# Patient Record
Sex: FEMALE | Race: WHITE | NOT HISPANIC OR LATINO | Employment: OTHER | URBAN - NONMETROPOLITAN AREA
[De-identification: names, ages, dates, MRNs, and addresses within clinical notes are randomized per-mention and may not be internally consistent; named-entity substitution may affect disease eponyms.]

---

## 2017-06-20 RX ORDER — METRONIDAZOLE 7.5 MG/G
GEL TOPICAL 2 TIMES DAILY
COMMUNITY
End: 2019-02-02

## 2017-08-14 ENCOUNTER — OFFICE VISIT (OUTPATIENT)
Dept: OTOLARYNGOLOGY | Facility: OTHER | Age: 72
End: 2017-08-14
Attending: FAMILY MEDICINE
Payer: COMMERCIAL

## 2017-08-14 VITALS
WEIGHT: 165 LBS | TEMPERATURE: 98.6 F | DIASTOLIC BLOOD PRESSURE: 78 MMHG | SYSTOLIC BLOOD PRESSURE: 122 MMHG | BODY MASS INDEX: 27.49 KG/M2 | HEIGHT: 65 IN | HEART RATE: 77 BPM | OXYGEN SATURATION: 96 %

## 2017-08-14 DIAGNOSIS — R49.0 DYSPHONIA: ICD-10-CM

## 2017-08-14 DIAGNOSIS — R09.89 THROAT CLEARING: ICD-10-CM

## 2017-08-14 DIAGNOSIS — J34.2 DNS (DEVIATED NASAL SEPTUM): ICD-10-CM

## 2017-08-14 DIAGNOSIS — R09.82 PND (POST-NASAL DRIP): Primary | ICD-10-CM

## 2017-08-14 DIAGNOSIS — J34.3 NASAL TURBINATE HYPERTROPHY: ICD-10-CM

## 2017-08-14 DIAGNOSIS — J30.89 SEASONAL ALLERGIC RHINITIS DUE TO OTHER ALLERGIC TRIGGER, UNSPECIFIED CHRONICITY: ICD-10-CM

## 2017-08-14 PROCEDURE — 92504 EAR MICROSCOPY EXAMINATION: CPT

## 2017-08-14 PROCEDURE — 31575 DIAGNOSTIC LARYNGOSCOPY: CPT | Performed by: OTOLARYNGOLOGY

## 2017-08-14 PROCEDURE — 99204 OFFICE O/P NEW MOD 45 MIN: CPT | Mod: 25 | Performed by: OTOLARYNGOLOGY

## 2017-08-14 PROCEDURE — 92504 EAR MICROSCOPY EXAMINATION: CPT | Performed by: OTOLARYNGOLOGY

## 2017-08-14 PROCEDURE — 99203 OFFICE O/P NEW LOW 30 MIN: CPT | Mod: 25

## 2017-08-14 RX ORDER — MEDROXYPROGESTERONE ACETATE 2.5 MG/1
2.5 TABLET ORAL DAILY
COMMUNITY
Start: 2012-09-17

## 2017-08-14 RX ORDER — MULTIVIT-MIN/IRON/FOLIC ACID/K 18-600-40
1 CAPSULE ORAL DAILY
COMMUNITY
Start: 2012-09-17

## 2017-08-14 RX ORDER — FLUTICASONE PROPIONATE 50 MCG
1-2 SPRAY, SUSPENSION (ML) NASAL DAILY
Qty: 1 BOTTLE | Refills: 11 | Status: SHIPPED | OUTPATIENT
Start: 2017-08-14 | End: 2019-02-02

## 2017-08-14 RX ORDER — HYDROCHLOROTHIAZIDE 25 MG/1
25 TABLET ORAL DAILY
COMMUNITY
Start: 2012-09-17

## 2017-08-14 RX ORDER — METRONIDAZOLE 10 MG/G
1 GEL TOPICAL DAILY
COMMUNITY
Start: 2013-03-13

## 2017-08-14 ASSESSMENT — PAIN SCALES - GENERAL: PAINLEVEL: NO PAIN (0)

## 2017-08-14 NOTE — NURSING NOTE
I spoke with the patient today regarding allergy skin testing.    All general instructions are reviewed with the patient.      The patient is made aware of all medications that need to be held prior to testing, and will stop medications as directed per instructions.  The patient verbalized understanding and agrees with plan.      Should the patient be given any additional medications prior to the day of testing, they are told to let the provider know that they are going to be allergy tested, so medications that need to be help prior to MQT are not prescribed.      An appointment will be arranged by the ENT [facility] for testing date and time. [facility] scheduling pt at this time. Pt instructions given to pt. Pt verbalizes understanding.     Danitza Chaudhry RN

## 2017-08-14 NOTE — PATIENT INSTRUCTIONS
Thank you for allowing Dr. Laech and our ENT team to participate in your care.  If you have a scheduling or an appointment question please contact [facility] Coordinator at their direct line [phone].   ALL nursing questions or concerns can be directed to your ENT nurse at: [phone] - [place]      Complete Allergy Skin Testing  Use Flonase as directed  Use Irene Med Saline Irrigation Twice Daily    Use high volume irene med saline irrigation.  Use warm distilled water and 2 packets of the salt solution that comes with the bottle, dissolve in bottle up to 240 ml shahab.  Irrigate each side of your nose leaning over the sink, using 1/3 to 1/2 the volume of the bottle in each nostril every irrigation.  Irrigate 2 times daily and as needed.    Indications for allergy testing include:   1) Confirm suspicion of allergic rhinitis due to inhalant allergies  2) Identify the offending allergen to determine specific mode of treatment  3) In the case of chronic rhinosinusitis: when symptoms are not controlled by avoidance and pharmacotherapy  4) In the Asthma patient when exacerbations may be due to perennial allergen exposure  5) Suspect food allergy  6) Otitis Media, chronic rhinitis, atopic dermatitis, Meniere disease, headache, pharyngitis or eye symptoms    modified quantitative testing (MQT) will be performed.  Signed consent was obtained, and the risks of immunotherapy were discussed, including the potential for anaphylaxis.    If immunotherapy (IT) is recommended, there is continued risk of anaphylaxis.   Anaphylaxis can cause death. The patient will need to be monitored for 30 minutes post injection.  They must present their epinephrine pen prior to injection.  Subcutaneous as well as sublingual immunotherapy (SLIT) were discussed as potential treatment options.  The patient was told SLIT is not approved by the FDA and is cash pay.  The general time frame of immunotherapy was discussed (generally  3-5 years, sometimes longer), and the basic immunology behind IT was discussed.      Adult lifestyle changes to prevent LPR reviewed      Avoid eating and drinking within two to three hours prior to bedtime    Do not drink alcohol    Eat small meals and slowly    Limit problem foods:    o Caffeine  o Carbonated drinks  o Chocolate  o Peppermint  o Tomato  o Citrus fruits  o Fatty and fried foods      Lose weight    Quit smoking    Wear loose clothing

## 2017-08-14 NOTE — PROGRESS NOTES
Otolaryngology Consultation    Patient: Maryse Rachel  : 1945    Chief Complaint   Patient presents with     Consult     Chronic postnasal drip, hoarseness, allergies Referred by Yury     HPI:  Maryse Rachel is a 72 year old female seen today for concerns regarding allergies. Since October, she was having issues with  PND, clearing the throat, sore throats, intermittent dysphonia, clear rhinorrhea, cough to clear throat, watery eyes and occasional sneezing. No sinus pain/pressure. No recent sinus infections. Claritin and Zyrtec initially worked for just a while. Most recently on Allegra with some noted improvement in symptoms. She continues to constantly clear throat. Feels she lost her singing voice. No otalgia. Some coughing when she is clearing her throat. No wheezing or SOB.     No issues with heartburn. Past history of smoking 50 years ago. Couple cups coffee per day. 2 drinks 3 days a week. 2 glasses of water per day.     No history of allergy testing. Lives in house. Unfinished basement. No water or mold issues. No animals in the house. No new exposures.   Sisters with allergies.    Current Outpatient Rx   Medication Sig Dispense Refill     VITAMIN D, CHOLECALCIFEROL, PO Take 2,000 Units by mouth daily       Ibuprofen (ADVIL PO) Take 200 mg by mouth every 6 hours as needed for moderate pain       HYDROCHLOROTHIAZIDE PO Take 25 mg by mouth daily       MedroxyPROGESTERone Acetate (PROVERA PO) Take 2.5 mg by mouth daily       metroNIDAZOLE (METROGEL) 0.75 % topical gel Apply topically 2 times daily         Allergies: Bactrim [sulfamethoxazole w/trimethoprim]     Past Medical History:   Diagnosis Date     Bilateral knee pain      Bilateral lower extremity edema      Essential hypertension      Postmenopausal hormone replacement therapy      Primary osteoarthritis involving multiple joints      Rectocele      Rosacea        Past Surgical History:   Procedure Laterality Date     C TOTAL KNEE  "ARTHROPLASTY Right 2015     COLONOSCOPY  2003     HERNIA REPAIR, INGUINAL RT/LT  2003       ENT family history reviewed    Social History   Substance Use Topics     Smoking status: Former Smoker     Smokeless tobacco: Not on file     Alcohol use Not on file       Review of Systems  ROS: 10 point ROS neg other than the symptoms noted above in the HPI. Also notes history of HTN.     Physical Exam  /78 (BP Location: Left arm, Patient Position: Chair, Cuff Size: Adult Regular)  Pulse 77  Temp 98.6  F (37  C) (Tympanic)  Ht 5' 5\" (1.651 m)  Wt 165 lb (74.8 kg)  SpO2 96%  BMI 27.46 kg/m2    General - The patient is well nourished and well developed, and appears to have good nutritional status.  Alert and oriented to person and place, answers questions and cooperates with examination appropriately.   Head and Face - Normocephalic and atraumatic, with no gross asymmetry noted.  The facial nerve is intact, with strong symmetric movements.  Voice and Breathing - The patient was breathing comfortably without the use of accessory muscles. There was no wheezing, stridor, or stertor.  The patients voice was clear and strong, and had appropriate pitch and quality.  Ears - External ears normal. The ear canals were examined underneath the operating microscope and with an otologic speculum. The canals were cleaned of all debris with gently suctioning and use of alligator forceps. There is no granulation tissue or sign of cholesteatoma. The patient tolerates this well. TM's are intact without effusion.  Eyes - Extraocular movements intact, and the pupils were reactive to light.  Sclera were not icteric or injected, conjunctiva were pink and moist.  Mouth - Examination of the oral cavity showed pink, healthy oral mucosa. No lesions or ulcerations noted.  The tongue was mobile and midline, and the dentition were in good condition.    Throat - The walls of the oropharynx were smooth, pink, moist, symmetric, and had no lesions " or ulcerations.  The tonsillar pillars and soft palate were symmetric. Tonsils grade 2. The uvula was midline on elevation.    Neck - Normal midline excursion of the laryngotracheal complex during swallowing.  Full range of motion on passive movement.  Palpation of the occipital, submental, submandibular, internal jugular chain, and supraclavicular nodes did not demonstrate any abnormal lymph nodes or masses.  Palpation of the thyroid was soft and smooth, with no nodules or goiter appreciated.  The trachea was mobile and midline.  Nose - External contour is symmetric, no gross deflection or scars.  Nasal mucosa is pink and moist with no abnormal mucus.    Attempts at mirror laryngoscopy were not possible due to gag reflex.  Therefore I proceeded with a fiberoptic examination after informed consent.  First I sprayed both sides of the nose with a mixture of lidocaine and neosynephrine.  I then passed the scope through the nasal cavity.     The nasal cavity was remarkable for: Turbinate hypertrophy bilateral with probable right santos.  Right DNS. No purulence or polyps noted.    The nasopharynx was mucosally covered and symmetric.  The eustachian tube openings were unobstructed.  Going further down I had a clear view of the base of tongue which had normal appearing lingual tonsillar tissue.  The base of tongue was free of lesions, and the vallecula was open.  The epiglottis was smooth and mucosally covered.  The supraglottic larynx was then clearly visualized.  The vocal cords moved smoothly and symmetrically and were pearly white.  The pyriform sinuses were open and without stella mass or pooling of secretions upon valsalva, and the limited view of the postcricoid region did not show any lesions.  The patient tolerated the procedure well.      ICD-10-CM    1. PND (post-nasal drip) R09.82    2. Throat clearing R68.89    3. Dysphonia R49.0    4. Seasonal allergic rhinitis due to other allergic trigger, unspecified  chronicity J30.89 fluticasone (FLONASE) 50 MCG/ACT spray   5. Nasal turbinate hypertrophy J34.3 fluticasone (FLONASE) 50 MCG/ACT spray   6. DNS (deviated nasal septum): right J34.2      Reassured no endolaryngeal mass  Start Flonase daily. BID Mohamud Med Sinus irrigation.  Increase intake of water to 6-8 glasses per day.  Dairy elimination trial.  Follow up after MQT done.  Consider in office TR of congestion persists, d/w Rachel today    Allergen avoidance measures were discussed and are important in preventing symptoms from occurring or worsening.     Follow up for allergy testing as recommended.  This may be a blood test (mRAST) or skin testing ( modified quantitative testing).     Indications for allergy testing include:   1) Confirm suspicion of allergic rhinitis due to inhalant allergies  2) Identify the offending allergen to determine specific mode of treatment  3) In the case of chronic rhinosinusitis: when symptoms are not controlled by avoidance and pharmacotherapy  4) In the Asthma patient when exacerbations may be due to perennial allergen exposure  5) Suspect food allergy  6) Otitis Media, chronic rhinitis, atopic dermatitis, Meniere disease, headache, pharyngitis or eye symptoms     Signed consent was obtained, and the risks of immunotherapy were discussed, including the potential for anaphylaxis    Stop your antihistamine 5 days before allergy testing.    If immunotherapy (IT) is recommended, there is continued risk of anaphylaxis. Anaphylaxis can cause death. The patient will need to be monitored for 30 minutes post injection. They must present their epinephrine pen prior to injection.  Subcutaneous as well as sublingual immunotherapy (SLIT) were discussed as potential treatment options. The patient was told SLIT is not approved by the FDA and is cash pay. The general time frame of immunotherapy was discussed (generally 3-5 years, sometimes longer), and the basic immunology behind IT was  discussed.    Alice Leach D.O.  Otolaryngology/Head and Neck Surgery  Allergy  I am the attending ENT physician supervising the training Nurse Practitioner or Physician Assistant.  I have observed and participated in the history and exam and agree with the documented findings.     Josephine Romero NP  ENT

## 2017-08-14 NOTE — NURSING NOTE
"Chief Complaint   Patient presents with     Consult     Chronic postnasal drip, hoarseness, allergies Referred by Yury       Initial /78 (BP Location: Left arm, Patient Position: Chair, Cuff Size: Adult Regular)  Pulse 77  Temp 98.6  F (37  C) (Tympanic)  Ht 5' 5\" (1.651 m)  Wt 165 lb (74.8 kg)  SpO2 96%  BMI 27.46 kg/m2 Estimated body mass index is 27.46 kg/(m^2) as calculated from the following:    Height as of this encounter: 5' 5\" (1.651 m).    Weight as of this encounter: 165 lb (74.8 kg).  Medication Reconciliation: complete   Talisha Fonseca          "

## 2017-08-14 NOTE — MR AVS SNAPSHOT
After Visit Summary   8/14/2017    Maryse Rachel    MRN: [id number]           Patient Information     Date Of Birth          1945        Visit Information        Provider Department      8/14/2017 9:15 AM Alice Leach MD [facility]        Today's Diagnoses     PND (post-nasal drip)    -  1    Throat clearing        Dysphonia          Care Instructions    Thank you for allowing Dr. Leach and our ENT team to participate in your care.  If you have a scheduling or an appointment question please contact [facility] Unit Coordinator at their direct line [phone].   ALL nursing questions or concerns can be directed to your ENT nurse at: [phone] - [place]      Complete Allergy Skin Testing  Use Flonase as directed  Use Irene Med Saline Irrigation Twice Daily    Use high volume irene med saline irrigation.  Use warm distilled water and 2 packets of the salt solution that comes with the bottle, dissolve in bottle up to 240 ml shahab.  Irrigate each side of your nose leaning over the sink, using 1/3 to 1/2 the volume of the bottle in each nostril every irrigation.  Irrigate 2 times daily and as needed.    Indications for allergy testing include:   1) Confirm suspicion of allergic rhinitis due to inhalant allergies  2) Identify the offending allergen to determine specific mode of treatment  3) In the case of chronic rhinosinusitis: when symptoms are not controlled by avoidance and pharmacotherapy  4) In the Asthma patient when exacerbations may be due to perennial allergen exposure  5) Suspect food allergy  6) Otitis Media, chronic rhinitis, atopic dermatitis, Meniere disease, headache, pharyngitis or eye symptoms    modified quantitative testing (MQT) will be performed.  Signed consent was obtained, and the risks of immunotherapy were discussed, including the potential for anaphylaxis.    If immunotherapy (IT) is recommended, there is continued risk of anaphylaxis.    "Anaphylaxis can cause death. The patient will need to be monitored for 30 minutes post injection.  They must present their epinephrine pen prior to injection.  Subcutaneous as well as sublingual immunotherapy (SLIT) were discussed as potential treatment options.  The patient was told SLIT is not approved by the FDA and is cash pay.  The general time frame of immunotherapy was discussed (generally 3-5 years, sometimes longer), and the basic immunology behind IT was discussed.      Adult lifestyle changes to prevent LPR reviewed      Avoid eating and drinking within two to three hours prior to bedtime    Do not drink alcohol    Eat small meals and slowly    Limit problem foods:    o Caffeine  o Carbonated drinks  o Chocolate  o Peppermint  o Tomato  o Citrus fruits  o Fatty and fried foods      Lose weight    Quit smoking    Wear loose clothing            Follow-ups after your visit        Who to contact     If you have questions or need follow up information about today's clinic visit or your schedule please contact [facility] directly at [phone].  Normal or non-critical lab and imaging results will be communicated to you by MyChart, letter or phone within 4 business days after the clinic has received the results. If you do not hear from us within 7 days, please contact the clinic through [employer] or phone. If you have a critical or abnormal lab result, we will notify you by phone as soon as possible.  Submit refill requests through [employer] or call your pharmacy and they will forward the refill request to us. Please allow 3 business days for your refill to be completed.          Additional Information About Your Visit        [employer] Information     [employer] lets you send messages to your doctor, view your test results, renew your prescriptions, schedule appointments and more. To sign up, go to [email or web address] . Click on \"Log in\" on the left side of the screen, which will take you to the " "Welcome page. Then click on \"Sign up Now\" on the right side of the page.     You will be asked to enter the access code listed below, as well as some personal information. Please follow the directions to create your username and password.     Your access code is: [email or web address]  Expires: 2017  9:42 AM     Your access code will  in 90 days. If you need help or a new code, please call your [facility] clinic or [phone].        Care EveryWhere ID     This is your Care EveryWhere ID. This could be used by other organizations to access your [facility] medical records  [id number]        Your Vitals Were     Pulse Temperature Height Pulse Oximetry BMI (Body Mass Index)       77 98.6  F (37  C) (Tympanic) 5' 5\" (1.651 m) 96% 27.46 kg/m2        Blood Pressure from Last 3 Encounters:   17 122/78    Weight from Last 3 Encounters:   17 165 lb (74.8 kg)              Today, you had the following     No orders found for display       Primary Care Provider    [facility] Other Clinic                Equal Access to Services     [facility]: Hadii lindy ku arti Up, waaxda luqadaha, qaybta kaalmada david, palmer santiago . So [facility] [phone].    ATENCIÓN: Si habla español, tiene a ball disposición servicios gratuitos de asistencia lingüística. Dudley al [phone].    We comply with applicable federal civil rights laws and [place] laws. We do not discriminate on the basis of race, color, national origin, age, disability sex, sexual orientation or gender identity.            Thank you!     Thank you for choosing [facility] [place]  for your care. Our goal is always to provide you with excellent care. Hearing back from our patients is one way we can continue to improve our services. Please take a few minutes to complete the written survey that you may receive in the mail after your visit with us. Thank you!             Your Updated Medication List - Protect others around you: " Learn how to safely use, store and throw away your medicines at www.disposemymeds.org.          This list is accurate as of: 8/14/17  9:42 AM.  Always use your most recent med list.                   Brand Name Dispense Instructions for use Diagnosis    ADVIL PO      Take 200 mg by mouth every 6 hours as needed for moderate pain        ALLEGRA ALLERGY PO      Take 1 capsule by mouth daily        * HYDROCHLOROTHIAZIDE PO      Take 25 mg by mouth daily        * hydrochlorothiazide 25 MG tablet    HYDRODIURIL     Take 25 mg by mouth daily        * metroNIDAZOLE 0.75 % topical gel    METROGEL     Apply topically 2 times daily        * metroNIDAZOLE 1 % gel    METROGEL     Apply 1 capful topically daily        * PROVERA PO      Take 2.5 mg by mouth daily        * medroxyPROGESTERone 2.5 MG tablet    PROVERA     Take 2.5 mg by mouth daily        * VITAMIN D (CHOLECALCIFEROL) PO      Take 2,000 Units by mouth daily        * vitamin D 2000 UNITS Caps      Take 1 tablet by mouth daily        * Notice:  This list has 8 medication(s) that are the same as other medications prescribed for you. Read the directions carefully, and ask your doctor or other care provider to review them with you.

## 2017-09-11 ENCOUNTER — OFFICE VISIT (OUTPATIENT)
Dept: ALLERGY | Facility: OTHER | Age: 72
End: 2017-09-11
Attending: NURSE PRACTITIONER
Payer: COMMERCIAL

## 2017-09-11 ENCOUNTER — OFFICE VISIT (OUTPATIENT)
Dept: OTOLARYNGOLOGY | Facility: OTHER | Age: 72
End: 2017-09-11
Attending: OTOLARYNGOLOGY
Payer: MEDICARE

## 2017-09-11 VITALS
DIASTOLIC BLOOD PRESSURE: 86 MMHG | TEMPERATURE: 98.8 F | SYSTOLIC BLOOD PRESSURE: 156 MMHG | BODY MASS INDEX: 28.17 KG/M2 | HEIGHT: 64 IN | WEIGHT: 165 LBS

## 2017-09-11 VITALS
DIASTOLIC BLOOD PRESSURE: 86 MMHG | BODY MASS INDEX: 28.17 KG/M2 | HEIGHT: 64 IN | SYSTOLIC BLOOD PRESSURE: 156 MMHG | TEMPERATURE: 98.8 F | WEIGHT: 165 LBS

## 2017-09-11 DIAGNOSIS — J34.2 DNS (DEVIATED NASAL SEPTUM): ICD-10-CM

## 2017-09-11 DIAGNOSIS — J34.3 NASAL TURBINATE HYPERTROPHY: ICD-10-CM

## 2017-09-11 DIAGNOSIS — J30.89 PERENNIAL ALLERGIC RHINITIS: Primary | ICD-10-CM

## 2017-09-11 DIAGNOSIS — J30.2 SEASONAL ALLERGIC RHINITIS: Primary | ICD-10-CM

## 2017-09-11 PROCEDURE — 95004 PERQ TESTS W/ALRGNC XTRCS: CPT

## 2017-09-11 PROCEDURE — 99213 OFFICE O/P EST LOW 20 MIN: CPT | Performed by: OTOLARYNGOLOGY

## 2017-09-11 PROCEDURE — 99212 OFFICE O/P EST SF 10 MIN: CPT | Mod: 25

## 2017-09-11 PROCEDURE — 95024 IQ TESTS W/ALLERGENIC XTRCS: CPT

## 2017-09-11 ASSESSMENT — PAIN SCALES - GENERAL
PAINLEVEL: NO PAIN (0)
PAINLEVEL: NO PAIN (0)

## 2017-09-11 NOTE — NURSING NOTE
"Chief Complaint   Patient presents with     other     MQT allergy skin testing       Initial /86 (BP Location: Right arm, Patient Position: Sitting, Cuff Size: Adult Regular)  Temp 98.8  F (37.1  C) (Tympanic)  Ht 5' 4\" (1.626 m)  Wt 165 lb (74.8 kg)  BMI 28.32 kg/m2 Estimated body mass index is 28.32 kg/(m^2) as calculated from the following:    Height as of this encounter: 5' 4\" (1.626 m).    Weight as of this encounter: 165 lb (74.8 kg).  Medication Reconciliation: complete       Patient presents for MQT allergy skin testing.    All medications were reviewed with patient.  Patient has held pertinent medications for testing.  Patient has not been sick recently and denies any major reactions of any type.  Patient denies having allergy testing in the past.        Current symptoms patient is experiencing include post nasal drip, a cough that causes her voice to be hoarse and her throat sore, itchy, watery eyes, and periodic sinus infections that have required abx treatment.  Patient states that the symptoms have been present throughout the year.  She does not notice a difference with the seasons.        Patient lives in a single family home with a basement.  The home is 39 years old and is located in [facility].  Patient reports the basement is dry, and she has not noticed mold.  The home is heated with natural gas, forced air heat and the home has central air.  The home has carpet, including carpet in the bedroom.  There are no pets in the home.     Consent was signed and patient signature verified.      Josephine Romero CNP, is not in today, but patient will meet with Dr. Leach after testing.          Patient tolerated allergy skin testing well.  Testing was done according to standard MQT protocol.  Test results were reviewed with the patient and she is given written information on allergy.  Patient will be seen by Dr. Leach.    Amber Sutlana RN  "

## 2017-09-11 NOTE — NURSING NOTE
"No chief complaint on file.      Initial /86  Temp 98.8  F (37.1  C) (Tympanic)  Ht 5' 4\" (1.626 m)  Wt 165 lb (74.8 kg)  BMI 28.32 kg/m2 Estimated body mass index is 28.32 kg/(m^2) as calculated from the following:    Height as of this encounter: 5' 4\" (1.626 m).    Weight as of this encounter: 165 lb (74.8 kg).  Medication Reconciliation: shyam Wilder      "

## 2017-09-11 NOTE — PATIENT INSTRUCTIONS
Thank you for allowing Dr. Leahc and our ENT team to participate in your care.  If you have a scheduling or an appointment question please contact [facility] our Health Unit Coordinator at their direct line [phone].   ALL nursing questions or concerns can be directed to your ENT nurse at: [phone] - [place]    Use Mohamud Med Saline Twice Daily as needed  Continue Flonase 2 sprays, twice daily for 1 month, then 2 sprays once daily   Continue Allegra  Maintain Allergy Avoidance  If no improvement, consider Allergy Shots

## 2017-09-11 NOTE — PROGRESS NOTES
"Otolaryngology Progress Note      History of Present Illness   No chief complaint on file.      Maryse (RACHEL) VAL Rachel is a 72 year old female   with chronic PND and throat clearing who presents for re-evaluation  Using bid irene med    Intradermal testing reviewed with Rachel:  High sensitivities to grass, oak, dust, moderates to weeds, additional trees, molds, and epidermals      Has been using nasal steroids and allegra. Stopped allegra prior to testing    Feels post nasal drainage may be somewhat improved    Physical Exam  /86  Temp 98.8  F (37.1  C) (Tympanic)  Ht 5' 4\" (1.626 m)  Wt 165 lb (74.8 kg)  BMI 28.32 kg/m2    General - The patient is well nourished and well developed, and appears to have good nutritional status.  Alert and oriented to person and place, interactive.  Head and Face - Normocephalic and atraumatic, with no gross asymmetry noted of the contour of the facial features.  The facial nerve is intact, with strong symmetric movements.  Nasal mucosa is pink and moist with no abnormal mucus.  The septum was grossly midline and non-obstructive, turbinates of normal size and position.  No polyps, masses, or purulence noted on examination. DNS Right ,  inferior turbinate hypertrophy     Impression/Plan  Maryse Rachel is a 72 year old female    ICD-10-CM    1. Perennial allergic rhinitis J30.89    2. DNS (deviated nasal septum) J34.2    3. Nasal turbinate hypertrophy J34.3      Subcutaneous as well as sublingual immunotherapy (SLIT) were discussed as potential treatment options if medical mgmt is not effective, as well as office turbinate reduction    The patient was told SLIT is not approved by the FDA and is cash pay.  The general time frame of immunotherapy was discussed (generally 3-5 years, sometimes longer), and the basic immunology behind IT was discussed.    She will continue medical mgmt and contact us if symptoms progress  Food cross-reactivities also discussed  She is happy with " this plan      Alice Leach D.O.  Otolaryngology/Head and Neck Surgery  Allergy

## 2017-09-11 NOTE — NURSING NOTE
"Initial /86 (BP Location: Right arm, Patient Position: Sitting, Cuff Size: Adult Regular)  Temp 98.8  F (37.1  C) (Tympanic)  Ht 5' 4\" (1.626 m)  Wt 165 lb (74.8 kg)  BMI 28.32 kg/m2 Estimated body mass index is 28.32 kg/(m^2) as calculated from the following:    Height as of this encounter: 5' 4\" (1.626 m).    Weight as of this encounter: 165 lb (74.8 kg).  Medication Reconciliation: complete       Patient presents for MQT allergy skin testing.    All medications were reviewed with patient.  Patient has held pertinent medications for testing.  Patient has not been sick recently and denies any major reactions of any type.  Patient denies having allergy testing in the past.        Current symptoms patient is experiencing include post nasal drip, a cough that causes her voice to be hoarse and her throat sore, itchy, watery eyes, and periodic sinus infections that have required abx treatment.  Patient states that the symptoms have been present throughout the year.  She does not notice a difference with the seasons.        Patient lives in a single family home with a basement.  The home is 39 years old and is located in [facility].  Patient reports the basement is dry, and she has not noticed mold.  The home is heated with natural gas, forced air heat and the home has central air.  The home has carpet, including carpet in the bedroom.  There are no pets in the home.     Consent was signed and patient signature verified.      Josephine Romero CNP, is not in today, but patient will meet with Dr. Leach after testing.          Patient tolerated allergy skin testing well.  Testing was done according to standard MQT protocol.  Test results were reviewed with the patient and she is given written information on allergy.  Patient will be seen by Dr. Leach.    Amber Sultana RN  "

## 2017-09-11 NOTE — MR AVS SNAPSHOT
"              After Visit Summary   2017    Maryse Rachel    MRN: [id number]           Patient Information     Date Of Birth          1945        Visit Information        Provider Department      2017 8:30 AM HC ALLERGY TESTING [facility]        Today's Diagnoses     Seasonal allergic rhinitis    -  1       Follow-ups after your visit        Who to contact     If you have questions or need follow up information about today's clinic visit or your schedule please contact [facility] directly at [phone].  Normal or non-critical lab and imaging results will be communicated to you by Evcarcohart, letter or phone within 4 business days after the clinic has received the results. If you do not hear from us within 7 days, please contact the clinic through Evcarcohart or phone. If you have a critical or abnormal lab result, we will notify you by phone as soon as possible.  Submit refill requests through CytRx or call your pharmacy and they will forward the refill request to us. Please allow 3 business days for your refill to be completed.          Additional Information About Your Visit        MyChart Information     CytRx lets you send messages to your doctor, view your test results, renew your prescriptions, schedule appointments and more. To sign up, go to [email or web address] . Click on \"Log in\" on the left side of the screen, which will take you to the Welcome page. Then click on \"Sign up Now\" on the right side of the page.     You will be asked to enter the access code listed below, as well as some personal information. Please follow the directions to create your username and password.     Your access code is: [email or web address]  Expires: 2017  9:42 AM     Your access code will  in 90 days. If you need help or a new code, please call your [facility] or [phone].        Care EveryWhere ID     This is your Care EveryWhere ID. This could be used by other " "organizations to access your [place] medical records  [id number]        Your Vitals Were     Temperature Height BMI (Body Mass Index)             98.8  F (37.1  C) (Tympanic) 5' 4\" (1.626 m) 28.32 kg/m2          Blood Pressure from Last 3 Encounters:   09/11/17 156/86   08/14/17 122/78    Weight from Last 3 Encounters:   09/11/17 165 lb (74.8 kg)   08/14/17 165 lb (74.8 kg)              Today, you had the following     No orders found for display       Primary Care Provider    Md Other Clinic                Equal Access to Services     [facility]: Hadii lindy chi Soravi, waaxda luqadaha, qaybta kaalmada david, palmer santiago . So [facility] [phone].    ATENCIÓN: Si habla español, tiene a ball disposición servicios gratuitos de asistencia lingüística. Llame al [phone].    We comply with applicable federal civil rights laws and [place] laws. We do not discriminate on the basis of race, color, national origin, age, disability sex, sexual orientation or gender identity.            Thank you!     Thank you for choosing [facility]  for your care. Our goal is always to provide you with excellent care. Hearing back from our patients is one way we can continue to improve our services. Please take a few minutes to complete the written survey that you may receive in the mail after your visit with us. Thank you!             Your Updated Medication List - Protect others around you: Learn how to safely use, store and throw away your medicines at www.disposemymeds.org.          This list is accurate as of: 9/11/17 10:52 AM.  Always use your most recent med list.                   Brand Name Dispense Instructions for use Diagnosis    ADVIL PO      Take 200 mg by mouth every 6 hours as needed for moderate pain        ALLEGRA ALLERGY PO      Take 1 capsule by mouth daily        fluticasone 50 MCG/ACT spray    FLONASE    1 Bottle    Spray 1-2 sprays into both nostrils daily "    Nasal turbinate hypertrophy, Seasonal allergic rhinitis due to other allergic trigger, unspecified chronicity       * HYDROCHLOROTHIAZIDE PO      Take 25 mg by mouth daily        * hydrochlorothiazide 25 MG tablet    HYDRODIURIL     Take 25 mg by mouth daily        LOSARTAN POTASSIUM PO      Take 25 mg by mouth daily        * metroNIDAZOLE 0.75 % topical gel    METROGEL     Apply topically 2 times daily        * metroNIDAZOLE 1 % gel    METROGEL     Apply 1 capful topically daily        MULTI-VITAMIN/IRON PO      Take 1 tablet by mouth daily        * PROVERA PO      Take 2.5 mg by mouth daily        * medroxyPROGESTERone 2.5 MG tablet    PROVERA     Take 2.5 mg by mouth daily        * VITAMIN D (CHOLECALCIFEROL) PO      Take 2,000 Units by mouth daily        * vitamin D 2000 UNITS Caps      Take 1 tablet by mouth daily        * Notice:  This list has 8 medication(s) that are the same as other medications prescribed for you. Read the directions carefully, and ask your doctor or other care provider to review them with you.

## 2017-09-11 NOTE — MR AVS SNAPSHOT
After Visit Summary   9/11/2017    Maryse Rachel    MRN: [id number]           Patient Information     Date Of Birth          1945        Visit Information        Provider Department      9/11/2017 10:30 AM Alice Leach MD [facility]        Care Instructions    Thank you for allowing Dr. Leach and our ENT team to participate in your care.  If you have a scheduling or an appointment question please contact [facility] Unit Coordinator at their direct line [phone].   ALL nursing questions or concerns can be directed to your ENT nurse at: [phone] - [place]    Use Mohamud Med Saline Twice Daily as needed  Continue Flonase 2 sprays, twice daily for 1 month, then 2 sprays once daily   Continue Allegra  Maintain Allergy Avoidance  If no improvement, consider Allergy Shots          Follow-ups after your visit        Follow-up notes from your care team     Return if symptoms worsen or fail to improve.      Who to contact     If you have questions or need follow up information about today's clinic visit or your schedule please contact [facility] directly at [phone].  Normal or non-critical lab and imaging results will be communicated to you by Embuehart, letter or phone within 4 business days after the clinic has received the results. If you do not hear from us within 7 days, please contact the clinic through Embuehart or phone. If you have a critical or abnormal lab result, we will notify you by phone as soon as possible.  Submit refill requests through Kyield or call your pharmacy and they will forward the refill request to us. Please allow 3 business days for your refill to be completed.          Additional Information About Your Visit        MyChart Information     Kyield lets you send messages to your doctor, view your test results, renew your prescriptions, schedule appointments and more. To sign up, go to [email or web address] . Click on  "\"Log in\" on the left side of the screen, which will take you to the Welcome page. Then click on \"Sign up Now\" on the right side of the page.     You will be asked to enter the access code listed below, as well as some personal information. Please follow the directions to create your username and password.     Your access code is: [email or web address]  Expires: 2017  9:42 AM     Your access code will  in 90 days. If you need help or a new code, please call your [place] clinic or [phone].        Care EveryWhere ID     This is your Care EveryWhere ID. This could be used by other organizations to access your [place] medical records  [id number]         Blood Pressure from Last 3 Encounters:   17 156/86   17 122/78    Weight from Last 3 Encounters:   17 165 lb (74.8 kg)   17 165 lb (74.8 kg)              Today, you had the following     No orders found for display       Primary Care Provider    Md Other Clinic                Equal Access to Services     [facility]: Hadii aad ku hadasho Soravi, waaxda luqadaha, qaybta kaalmada adetemitope, palmer santiago . So [facility] [phone].    ATENCIÓN: Si habla español, tiene a ball disposición servicios gratuitos de asistencia lingüística. Llame al [phone].    We comply with applicable federal civil rights laws and [place] laws. We do not discriminate on the basis of race, color, national origin, age, disability sex, sexual orientation or gender identity.            Thank you!     Thank you for choosing [facility]  for your care. Our goal is always to provide you with excellent care. Hearing back from our patients is one way we can continue to improve our services. Please take a few minutes to complete the written survey that you may receive in the mail after your visit with us. Thank you!             Your Updated Medication List - Protect others around you: Learn how to safely use, store and throw " away your medicines at www.disposemymeds.org.          This list is accurate as of: 9/11/17 11:24 AM.  Always use your most recent med list.                   Brand Name Dispense Instructions for use Diagnosis    ADVIL PO      Take 200 mg by mouth every 6 hours as needed for moderate pain        ALLEGRA ALLERGY PO      Take 1 capsule by mouth daily        fluticasone 50 MCG/ACT spray    FLONASE    1 Bottle    Spray 1-2 sprays into both nostrils daily    Nasal turbinate hypertrophy, Seasonal allergic rhinitis due to other allergic trigger, unspecified chronicity       * HYDROCHLOROTHIAZIDE PO      Take 25 mg by mouth daily        * hydrochlorothiazide 25 MG tablet    HYDRODIURIL     Take 25 mg by mouth daily        LOSARTAN POTASSIUM PO      Take 25 mg by mouth daily        * metroNIDAZOLE 0.75 % topical gel    METROGEL     Apply topically 2 times daily        * metroNIDAZOLE 1 % gel    METROGEL     Apply 1 capful topically daily        MULTI-VITAMIN/IRON PO      Take 1 tablet by mouth daily        * PROVERA PO      Take 2.5 mg by mouth daily        * medroxyPROGESTERone 2.5 MG tablet    PROVERA     Take 2.5 mg by mouth daily        * VITAMIN D (CHOLECALCIFEROL) PO      Take 2,000 Units by mouth daily        * vitamin D 2000 UNITS Caps      Take 1 tablet by mouth daily        * Notice:  This list has 8 medication(s) that are the same as other medications prescribed for you. Read the directions carefully, and ask your doctor or other care provider to review them with you.

## 2019-02-02 ENCOUNTER — HOSPITAL ENCOUNTER (EMERGENCY)
Facility: HOSPITAL | Age: 74
Discharge: HOME | End: 2019-02-02
Attending: PHYSICIAN ASSISTANT | Admitting: PHYSICIAN ASSISTANT
Payer: MEDICARE

## 2019-02-02 VITALS
DIASTOLIC BLOOD PRESSURE: 115 MMHG | OXYGEN SATURATION: 98 % | WEIGHT: 160 LBS | RESPIRATION RATE: 16 BRPM | BODY MASS INDEX: 27.46 KG/M2 | HEART RATE: 103 BPM | TEMPERATURE: 98.8 F | SYSTOLIC BLOOD PRESSURE: 186 MMHG

## 2019-02-02 DIAGNOSIS — N81.4 UTERINE PROLAPSE: ICD-10-CM

## 2019-02-02 LAB
ALBUMIN UR-MCNC: NEGATIVE MG/DL
APPEARANCE UR: CLEAR
BACTERIA #/AREA URNS HPF: ABNORMAL /HPF
BILIRUB UR QL STRIP: NEGATIVE
COLOR UR AUTO: ABNORMAL
GLUCOSE UR STRIP-MCNC: NEGATIVE MG/DL
HGB UR QL STRIP: ABNORMAL
KETONES UR STRIP-MCNC: NEGATIVE MG/DL
LEUKOCYTE ESTERASE UR QL STRIP: ABNORMAL
MUCOUS THREADS #/AREA URNS LPF: PRESENT /LPF
NITRATE UR QL: NEGATIVE
PH UR STRIP: 6.5 PH (ref 4.7–8)
RBC #/AREA URNS AUTO: 7 /HPF (ref 0–2)
SOURCE: ABNORMAL
SP GR UR STRIP: 1.01 (ref 1–1.03)
SQUAMOUS #/AREA URNS AUTO: 3 /HPF (ref 0–1)
UROBILINOGEN UR STRIP-MCNC: NORMAL MG/DL (ref 0–2)
WBC #/AREA URNS AUTO: 14 /HPF (ref 0–5)

## 2019-02-02 PROCEDURE — 99202 OFFICE O/P NEW SF 15 MIN: CPT | Mod: 25 | Performed by: OBSTETRICS & GYNECOLOGY

## 2019-02-02 PROCEDURE — 87086 URINE CULTURE/COLONY COUNT: CPT | Performed by: PHYSICIAN ASSISTANT

## 2019-02-02 PROCEDURE — 99283 EMERGENCY DEPT VISIT LOW MDM: CPT | Mod: Z6 | Performed by: PHYSICIAN ASSISTANT

## 2019-02-02 PROCEDURE — 81001 URINALYSIS AUTO W/SCOPE: CPT | Performed by: PHYSICIAN ASSISTANT

## 2019-02-02 PROCEDURE — 57160 INSERT PESSARY/OTHER DEVICE: CPT | Performed by: OBSTETRICS & GYNECOLOGY

## 2019-02-02 PROCEDURE — 99283 EMERGENCY DEPT VISIT LOW MDM: CPT | Mod: 25

## 2019-02-02 NOTE — ED NOTES
Patient was able to go to bathroom and empty bladder further.  Prior to discharge bladder scan 40 ml.  Discharge instructions reviewed with patient.  Patient with elevated BP, asymptomatic will follow up with GYN appt on Tuesday.  Encouraged patient to follow up if develops headaches, dizziness to return to ED sooner.  Patient verbalized understanding.   Provider notified.  Denies any pain, bladder feels empty.

## 2019-02-02 NOTE — ED NOTES
Patient up to bathroom to void and voided 250ml, patient did void more but started leaking urine on way to bathroom.   Bladder scan performed.  Approximate 150ml left.

## 2019-02-02 NOTE — CONSULTS
ED consult.  Uterovaginal prolapse with urinary retention.    74-year-old female with history of uterovaginal prolapse.  She has been working with Dr. West  in [place] with pessary management.  She has been using a Gellhorn pessary but did developed an erosion with bleeding and it was removed.  She has a follow-up appointment with Dr. West in the clinic in on Tuesday for possible pessary refitting.  Over the last several days she has had increasing reduce the prolapse.  She is also been having problems voiding.  Denies vaginal bleeding currently, abnormal discharge.  She has been using the Premarin vaginal cream as prescribed.  No other complaints.           Past Medical History:   Diagnosis Date     Bilateral knee pain      Bilateral lower extremity edema      Essential hypertension      Postmenopausal hormone replacement therapy      Primary osteoarthritis involving multiple joints      Rectocele      Rosacea             Past Surgical History:   Procedure Laterality Date     C TOTAL KNEE ARTHROPLASTY Right 2015     COLONOSCOPY  2003     HERNIA REPAIR, INGUINAL RT/LT  2003            Social History     Tobacco Use     Smoking status: Former Smoker     Smokeless tobacco: Never Used   Substance Use Topics     Alcohol use: Yes     Comment: 5-6 drinks per week            Family History   Problem Relation Age of Onset     Arthritis Mother      Chronic Obstructive Pulmonary Disease Father                Allergies   Allergen Reactions     Bactrim [Sulfamethoxazole W/Trimethoprim] Rash            Current Outpatient Medications   Medication Sig Dispense Refill     Cholecalciferol (VITAMIN D) 2000 UNITS CAPS Take 1 tablet by mouth daily       hydrochlorothiazide (HYDRODIURIL) 25 MG tablet Take 25 mg by mouth daily       Ibuprofen (ADVIL PO) Take 200 mg by mouth every 6 hours as needed for moderate pain       LOSARTAN POTASSIUM PO Take 25 mg by mouth daily       medroxyPROGESTERone (PROVERA) 2.5 MG tablet Take 2.5 mg  by mouth daily       Fexofenadine HCl (ALLEGRA ALLERGY PO) Take 1 capsule by mouth daily       metroNIDAZOLE (METROGEL) 1 % gel Apply 1 capful topically daily            Review Of Systems  Constitutional:  Denies fever  GI/ negative except as noted per hpi    O:   BP (!) 172/107   Pulse 115   Temp 98.8  F (37.1  C) (Tympanic)   Resp 18   Wt 72.6 kg (160 lb)   SpO2 98%   BMI 27.46 kg/m    Gen:  NAD, A and O  Pelvis Grade 4 uterovaginal prolapse. Vagina without lesions.  Prolapse reduced.  #2.75 Gelhorn Pessary refitted for pt.           A:  Uterovaginal prolapse with urinary retention.     P:  Successful pessary refitting in ED.  Pt will f/u with  as planned in clinic on Tuesday.  Pt was able to void prior to discharge and was very appreciative of our assistance.

## 2019-02-02 NOTE — ED NOTES
Patient ambulatory to room 4.  Presents with complaints of inability to void which started yesterday.   Patient had pessary ring removed 2 weeks ago by Dr. Rodriguez and last week was placed on cipro for bladder infection. Patient without nausea, vomiting and diarrhea.    Has appt with Dr. Rodriguez on Tuesday.

## 2019-02-02 NOTE — ED AVS SNAPSHOT
[facility]  [address] [address]  Phone:  [phone]                                    Maryse Rachel   MRN: [id number]    Department:  [facility]   Date of Visit:  2/2/2019           After Visit Summary Signature Page    I have received my discharge instructions, and my questions have been answered. I have discussed any challenges I see with this plan with the nurse or doctor.    ..........................................................................................................................................  Patient/Patient Representative Signature      ..........................................................................................................................................  Patient Representative Print Name and Relationship to Patient    ..................................................               ................................................  Date                                   Time    ..........................................................................................................................................  Reviewed by Signature/Title    ...................................................              ..............................................  Date                                               Time          22EPIC Rev 08/18

## 2019-02-04 LAB
BACTERIA SPEC CULT: NORMAL
SPECIMEN SOURCE: NORMAL

## 2019-02-04 ASSESSMENT — ENCOUNTER SYMPTOMS
DYSURIA: 0
DIFFICULTY URINATING: 1
PSYCHIATRIC NEGATIVE: 1
ABDOMINAL PAIN: 0
NAUSEA: 0
CARDIOVASCULAR NEGATIVE: 1
FREQUENCY: 1
FEVER: 0
RESPIRATORY NEGATIVE: 1
CONSTITUTIONAL NEGATIVE: 1
VOMITING: 0
FLANK PAIN: 0
HEMATURIA: 0
CHILLS: 0

## 2019-02-04 NOTE — ED PROVIDER NOTES
"  History     Chief Complaint   Patient presents with     inability to void     \"was seen at the [facility] in [place] earlier today around 1400.  No bladder infection, has a prolapsed cervix, unable to void.\"      HPI  Maryse Rachel is a 74 year old female with Hx HTN who presents to ER c/o uterine prolapse. She had a pessary placed over 1 month ago that was removed 2 weeks ago d/t erosion. She reports prolapse is no longer reducible and is causing difficulty with urination. She adds she is on the last day of cipro for UTI, denies any dysuria, fever.      Allergies:  Allergies   Allergen Reactions     Bactrim [Sulfamethoxazole W/Trimethoprim] Rash       Problem List:    There are no active problems to display for this patient.       Past Medical History:    Past Medical History:   Diagnosis Date     Bilateral knee pain      Bilateral lower extremity edema      Essential hypertension      Postmenopausal hormone replacement therapy      Primary osteoarthritis involving multiple joints      Rectocele      Rosacea        Past Surgical History:    Past Surgical History:   Procedure Laterality Date     C TOTAL KNEE ARTHROPLASTY Right 2015     COLONOSCOPY  2003     HERNIA REPAIR, INGUINAL RT/LT  2003       Family History:    Family History   Problem Relation Age of Onset     Arthritis Mother      Chronic Obstructive Pulmonary Disease Father        Social History:  Marital Status:   [2]  Social History     Tobacco Use     Smoking status: Former Smoker     Smokeless tobacco: Never Used   Substance Use Topics     Alcohol use: Yes     Comment: 5-6 drinks per week     Drug use: No        Medications:      Cholecalciferol (VITAMIN D) 2000 UNITS CAPS   hydrochlorothiazide (HYDRODIURIL) 25 MG tablet   Ibuprofen (ADVIL PO)   LOSARTAN POTASSIUM PO   medroxyPROGESTERone (PROVERA) 2.5 MG tablet   Fexofenadine HCl (ALLEGRA ALLERGY PO)   metroNIDAZOLE (METROGEL) 1 % gel         Review of Systems "   Constitutional: Negative.  Negative for chills and fever.   Respiratory: Negative.    Cardiovascular: Negative.    Gastrointestinal: Negative for abdominal pain, nausea and vomiting.   Genitourinary: Positive for difficulty urinating, frequency and pelvic pain (pressure/discomfort). Negative for dysuria, flank pain, hematuria and urgency.   Skin: Negative.  Negative for rash.   Psychiatric/Behavioral: Negative.        Physical Exam   BP: (!) 172/107  Pulse: 115  Temp: 98.8  F (37.1  C)  Resp: 18  Weight: 72.6 kg (160 lb)  SpO2: 98 %      Physical Exam   Constitutional: She is oriented to person, place, and time. She appears well-developed and well-nourished. No distress.   Eyes: Conjunctivae are normal.   Cardiovascular: Normal rate and regular rhythm.   Pulmonary/Chest: Effort normal and breath sounds normal.   Genitourinary:   Genitourinary Comments: Obvious prolapse. No external lesions/skin changes.    Neurological: She is alert and oriented to person, place, and time.   Nursing note and vitals reviewed.      ED Course        Procedures    Labs Ordered and Resulted from Time of ED Arrival Up to the Time of Departure from the ED   UA MACROSCOPIC WITH REFLEX TO MICRO AND CULTURE - Abnormal; Notable for the following components:       Result Value    Blood Urine Trace (*)     Leukocyte Esterase Urine Moderate (*)     RBC Urine 7 (*)     WBC Urine 14 (*)     Bacteria Urine Few (*)     Squamous Epithelial /HPF Urine 3 (*)     Mucous Urine Present (*)     All other components within normal limits       No results found for this or any previous visit (from the past 24 hour(s)).    Medications - No data to display    Assessments & Plan (with Medical Decision Making)     I have reviewed the nursing notes.    I have reviewed the findings, diagnosis, plan and need for follow up with the patient.       Medication List      There are no discharge medications for this visit.         Final diagnoses:   Uterine prolapse   I  spoke with Dr Aguilar who was able to see patient in the ER to fit for pessary. UA was obtained while Maryse was in the waiting room and has epis, few bacteria - culture is pending. Pt is happy with interventions and will contact if culture indicates any needed change in plans. F/U with GYN as planned, returning here sooner with any concern. She is agreeable to plan and discharged home in stable condition.     2/2/2019   [facility]     Zaire Hopkins, PA  02/04/19 0111

## 2022-07-19 ENCOUNTER — MEDICAL CORRESPONDENCE (OUTPATIENT)
Dept: MRI IMAGING | Facility: HOSPITAL | Age: 77
End: 2022-07-19